# Patient Record
Sex: FEMALE | Race: OTHER | HISPANIC OR LATINO
[De-identification: names, ages, dates, MRNs, and addresses within clinical notes are randomized per-mention and may not be internally consistent; named-entity substitution may affect disease eponyms.]

---

## 2022-02-25 PROBLEM — Z00.00 ENCOUNTER FOR PREVENTIVE HEALTH EXAMINATION: Status: ACTIVE | Noted: 2022-02-25

## 2022-03-04 ENCOUNTER — APPOINTMENT (OUTPATIENT)
Dept: GYNECOLOGIC ONCOLOGY | Facility: CLINIC | Age: 52
End: 2022-03-04
Payer: SELF-PAY

## 2022-03-04 ENCOUNTER — NON-APPOINTMENT (OUTPATIENT)
Age: 52
End: 2022-03-04

## 2022-03-04 VITALS
HEIGHT: 61 IN | SYSTOLIC BLOOD PRESSURE: 172 MMHG | OXYGEN SATURATION: 100 % | WEIGHT: 111 LBS | HEART RATE: 81 BPM | TEMPERATURE: 97.1 F | DIASTOLIC BLOOD PRESSURE: 93 MMHG | BODY MASS INDEX: 20.96 KG/M2

## 2022-03-04 PROCEDURE — 58100 BIOPSY OF UTERUS LINING: CPT

## 2022-03-04 PROCEDURE — 99202 OFFICE O/P NEW SF 15 MIN: CPT | Mod: 25

## 2022-03-04 NOTE — HISTORY OF PRESENT ILLNESS
[FreeTextEntry1] : Previous Therapy:\par 1. TVUS (2/22/22)\par - Uterus: 10.3 x 5.6 x 6.3 cm\par - R. Ovary: 5.1 x 5.0 x 4.8cm complex hemorrhagic cyst\par - L Ovary: 4.2 x 3.0 x 2.4cm complex hemorrhagic cyst\par 2.  (1/26/22)\par - 155 u/mL \par 3. Anemia (1/26/22)\par - RBC: 3.50\par - HgB/Hct: 8.8 / 28\par - MCH: 25.1, MCHC: 31.4, RDW: 15.4

## 2022-03-04 NOTE — ASSESSMENT
[FreeTextEntry1] : I discussed with the patient with the aid of diagrams, reviewed the findings on history and physical examination, and reviewed the imaging studies in detail.  We reviewed the cystic/hemorrhagic nature of the mass, the CA-125, and other tumor markers. ACOG management of adnexal masses has been reviewed. SHe also has abnormal uterine bleeding x 3 months. While this may be 2' to the cervical polyp, uterine abnormalities must also be ruled out.\par \par We discussed the differential diagnosis which includes benign, low malignant potential tumors and malignancy. Other etiologies of adnexal masses, such as metastatic disease from another organ site also discussed.\par \par In the case of asymptomatic cysts, without findings concerning for malignancy (such as solid components, increasing size and vascular septations, elevated tumor markers) conservative management is an option. \par \par In the case of symptomatic cysts, or those with findings concerning for malignancy, the recommendation is for surgical removal. Again, with the aid of diagrams, different surgical approaches discussed including minimally invasive and open approaches.\par \par Laparoscopic USO vs. BSO discussed. Increased risk of cyst rupture with ovarian cystectomy explained. Frozen section will be performed; and the appropriate additional management including but not limited to removal of the other ovary, total hysterectomy, pelvic and para-aortic lymph node dissection, omentectomy and appendectomy. NCCN guidelines discussed. Possible laparotomy also discussed. If diagnosis is not clear on frozen section, we will avoid any additional procedures and defer to permanent pathology. This may require additional surgery and the patient states understanding this. \par \par Complications that include, but are not limited to: bleeding, infection, injury to other organs including bowel, bladder, ureters, blood vessels, nerves; infections, blood clots, lymphedema, pneumonia, wound complications and prolonged hospital stay have all been discussed with the patient. Whenever minimally invasive surgery is attempted, there is a chance of needing to convert to laparotomy. The risk of occult injury requiring additional surgery also discussed. I have also provided her with the diagrams.  \par \par Patient electing for conservative management at this time. I recommend a repeat TVS in 3-4 months to assess for any interval changes. I will also repeat  today as a trend. I explained that if  is increasing, I will likely recommend surgery.\par \par [] , , CEA today\par [] EMBx\par [] Polyp\par [] TVS in May\par [] Follow up after TVS\par [] Social work for financial assistance options.\par

## 2022-03-04 NOTE — PHYSICAL EXAM
[Abnormal] : Adnexa(ae): Abnormal [Normal] : Recto-Vaginal Exam: Normal [de-identified] : soft, mild tenderness in RLQ with deep palpation [de-identified] : brown discharge and a 5mm polyp arising from the cervical os [de-identified] : right 5 cm cyst abutting the right aspect of the uterus, left ovarian cyst could not be appreciated. mobile, mildly tender

## 2022-03-04 NOTE — CHIEF COMPLAINT
[FreeTextEntry1] : 52 yo referred by ob/gyn Dr. Arnold Mcdonald d/t complex bilateral hemorrhagic cyst & elevated Ca 125 of 155. R cyst approx 5cm, L cyst 4cm, endometrioma vs hemorrhagic cyst. Patient reports menses became irregular Oct 2021, intermittent spotting, sometimes lasting 15 days. She saw Dr. Mcdonald who ordered an ultrasound for the irregular bleeding and the b/l cysts were found.  Had TVUS in March 2021 that showed small fibroids but no ovarian cysts. LMP 1/31. Denies hx of pelvic pain, dysmenorrhea, no pain with BMs. Currently sexually active, no bleeding or pain with intercourse. \par \par OB Hx: c/s x3, SAB  \par GYN Hx: denies abnl paps, last March 2021 \par PMHx: denies \par PSHx: c/s x3 \par Meds: denies \par Allergies: NKDA \par FHx: Mother-  colon cancer \par Social Hx: Denies tobacco, occasional alcohol use, Lives in [place] with son and , works as a   \par \par Last Pap: March 2021- nl per patient \par Last Mammo: March 2021- nl per patient \par Last Colonoscopy: 2018- nl per patient \par

## 2022-03-04 NOTE — REVIEW OF SYSTEMS
[Negative] : Musculoskeletal [Vaginal Discharge] : vaginal discharge [Abn Vag Bleeding] : abnormal vaginal bleeding

## 2022-03-07 ENCOUNTER — NON-APPOINTMENT (OUTPATIENT)
Age: 52
End: 2022-03-07

## 2022-03-08 LAB
CANCER AG125 SERPL-ACNC: 14 U/ML
CANCER AG19-9 SERPL-ACNC: 18 U/ML
CEA SERPL-MCNC: 1.4 NG/ML
CORE LAB BIOPSY: NORMAL

## 2022-03-21 ENCOUNTER — NON-APPOINTMENT (OUTPATIENT)
Age: 52
End: 2022-03-21

## 2022-03-29 ENCOUNTER — NON-APPOINTMENT (OUTPATIENT)
Age: 52
End: 2022-03-29

## 2022-05-26 ENCOUNTER — NON-APPOINTMENT (OUTPATIENT)
Age: 52
End: 2022-05-26

## 2022-06-07 ENCOUNTER — APPOINTMENT (OUTPATIENT)
Dept: GYNECOLOGIC ONCOLOGY | Facility: CLINIC | Age: 52
End: 2022-06-07

## 2023-02-16 ENCOUNTER — NON-APPOINTMENT (OUTPATIENT)
Age: 53
End: 2023-02-16

## 2023-02-21 ENCOUNTER — APPOINTMENT (OUTPATIENT)
Dept: GYNECOLOGIC ONCOLOGY | Facility: CLINIC | Age: 53
End: 2023-02-21
Payer: MEDICAID

## 2023-02-21 VITALS
SYSTOLIC BLOOD PRESSURE: 136 MMHG | HEART RATE: 81 BPM | WEIGHT: 117 LBS | HEIGHT: 61 IN | BODY MASS INDEX: 22.09 KG/M2 | TEMPERATURE: 97.3 F | DIASTOLIC BLOOD PRESSURE: 94 MMHG | OXYGEN SATURATION: 95 %

## 2023-02-21 PROCEDURE — 58100 BIOPSY OF UTERUS LINING: CPT

## 2023-02-21 PROCEDURE — 99215 OFFICE O/P EST HI 40 MIN: CPT | Mod: 25

## 2023-02-21 RX ORDER — NORETHINDRONE ACETATE AND ETHINYL ESTRADIOL 1; 20 MG/1; UG/1
1-20 TABLET ORAL
Qty: 3 | Refills: 3 | Status: ACTIVE | COMMUNITY
Start: 2023-02-21 | End: 1900-01-01

## 2023-02-22 LAB
BASOPHILS # BLD AUTO: 0.05 K/UL
BASOPHILS NFR BLD AUTO: 0.9 %
EOSINOPHIL # BLD AUTO: 0.08 K/UL
EOSINOPHIL NFR BLD AUTO: 1.4 %
HCT VFR BLD CALC: 40.3 %
HGB BLD-MCNC: 12.9 G/DL
IMM GRANULOCYTES NFR BLD AUTO: 0 %
IRON SATN MFR SERPL: 9 %
IRON SERPL-MCNC: 43 UG/DL
LYMPHOCYTES # BLD AUTO: 1.44 K/UL
LYMPHOCYTES NFR BLD AUTO: 24.8 %
MAN DIFF?: NORMAL
MCHC RBC-ENTMCNC: 30.1 PG
MCHC RBC-ENTMCNC: 32 GM/DL
MCV RBC AUTO: 93.9 FL
MONOCYTES # BLD AUTO: 0.4 K/UL
MONOCYTES NFR BLD AUTO: 6.9 %
NEUTROPHILS # BLD AUTO: 3.84 K/UL
NEUTROPHILS NFR BLD AUTO: 66 %
PLATELET # BLD AUTO: 388 K/UL
RBC # BLD: 4.29 M/UL
RBC # FLD: 17.2 %
TIBC SERPL-MCNC: 475 UG/DL
UIBC SERPL-MCNC: 433 UG/DL
WBC # FLD AUTO: 5.81 K/UL

## 2023-02-24 ENCOUNTER — APPOINTMENT (OUTPATIENT)
Dept: GYNECOLOGIC ONCOLOGY | Facility: CLINIC | Age: 53
End: 2023-02-24
Payer: MEDICAID

## 2023-02-24 DIAGNOSIS — N83.299 OTHER OVARIAN CYST, UNSPECIFIED SIDE: ICD-10-CM

## 2023-02-24 DIAGNOSIS — N84.1 POLYP OF CERVIX UTERI: ICD-10-CM

## 2023-02-24 PROCEDURE — 36415 COLL VENOUS BLD VENIPUNCTURE: CPT

## 2023-02-27 ENCOUNTER — NON-APPOINTMENT (OUTPATIENT)
Age: 53
End: 2023-02-27

## 2023-02-27 LAB
CANCER AG125 SERPL-ACNC: 57 U/ML
CANCER AG19-9 SERPL-ACNC: 25 U/ML
CEA SERPL-MCNC: 1.6 NG/ML
CORE LAB BIOPSY: NORMAL
CYTOLOGY CVX/VAG DOC THIN PREP: NORMAL
HPV HIGH+LOW RISK DNA PNL CVX: NOT DETECTED

## 2023-02-27 NOTE — ASSESSMENT
[FreeTextEntry1] : I discussed with the patient with the aid of diagrams, reviewed the findings on history and physical examination, and reviewed the imaging studies in detail. We reviewed the cystic/hemorrhagic nature of the mass, the CA-125, and other tumor markers. ACOG management of adnexal masses has been reviewed. SHe also has abnormal uterine bleeding x 9 months. While this may be 2' to the cervical polyp, uterine abnormalities must also be ruled out.\par \par We discussed the differential diagnosis which includes benign, low malignant potential tumors and malignancy. Other etiologies of adnexal masses, such as metastatic disease from another organ site also discussed.\par \par In the case of asymptomatic cysts, without findings concerning for malignancy (such as solid components, increasing size and vascular septations, elevated tumor markers) conservative management is an option. In her case, the cysts have decreased in size which is more consistent with benign etiology. However, the radiology report describes the cysts as complex and "abnormal." I have left a message with the radiology office to have the Dr. Huerta call me.\par \par In the case of symptomatic cysts, or those with findings concerning for malignancy, the recommendation is for surgical removal. Again, with the aid of diagrams, different surgical approaches discussed including minimally invasive and open approaches.\par \par Causes of abnormal uterine bleeding also disucssed. EMBx successfully performed today.\par \par Patient likely needs a withdrawl bleed to "reset" her endometrial lining. I have advised that she stop the progesterone and start OCPs. She was screened for and has no contraindications to OCPs.\par \par [] Rx Lo-Estrin\par [] Tumor markers\par [] CBC and Iron studies\par [] PAP co-test\par [] EMBx\par [] Review Ultrasound findings with Dr. Huerta\par \par Addendum: Ultrasound reviewed with Dr. Huerta and most consistent with hemorrhagic cyst vs. endometrioma. He confirmed that there are no papillations or solid components and that the appearance of the cyst is unchanged from prior U/S. Will recommend that patient follow up with me in 2 months to reassess her uterine bleeding and to repeat tumor markers. \par \par

## 2023-02-27 NOTE — HISTORY OF PRESENT ILLNESS
[FreeTextEntry1] : Previous Therapy:\par 1. TVUS (2/22/22)\par - Uterus: 10.3 x 5.6 x 6.3 cm\par - R. Ovary: 5.1 x 5.0 x 4.8cm complex hemorrhagic cyst\par - L Ovary: 4.2 x 3.0 x 2.4cm complex hemorrhagic cyst\par 2.  (1/26/22)\par - 155 u/mL \par 3. Anemia (1/26/22)\par - RBC: 3.50\par - HgB/Hct: 8.8 / 28\par - MCH: 25.1, MCHC: 31.4, RDW: 15.4\par 4. S/P EMB AND endocervic polypectomy 3/2022- benign \par 5. Pelvic US 5/2022\par    a) right ovary - 5.6cm hemorrhagic cyst vs endometrioma\par    b) left ovary- 3.5cm septated cyst  \par  \par 6. Pelvis US 2/16/23\par    a) 10cm uterus\par    b) endometrium 0.67\par    c) right ovary - 3.9cm complex cyst - "abnormal" \par    d) left ovary- 5.2cm complex cyst - "abnormal"

## 2023-02-27 NOTE — PHYSICAL EXAM
[Abnormal] : Adnexa(ae): Abnormal [Normal] : Recto-Vaginal Exam: Normal [de-identified] : soft, mild tenderness in RLQ with deep palpation [de-identified] : brown discharge and a 5mm polyp arising from the cervical os [de-identified] : right ovarian cyst difficult to appreciate. Left ovarian cyst appreciated within ovarian fossa. nontender

## 2023-02-27 NOTE — REASON FOR VISIT
[FreeTextEntry1] : Pt presents for 1 year follow up. She presents c/o abnormal bleeding. Pt reports for the past 9 montsh she has been bleeding every day. Patient was recommended to follow up last May, but did not have insurance and was lost to follow up.\par \par She has been on norethindrone 5mg every day for 2 months but it hasn’t stopped.  She denies pelvic pain. Pelvic US ordered on 2/16/23.\par \par Pt states she didn’t come earlier because she didn’t have insurance up until last month. \par \par Health Maintenance\par Last Mammogram June 2022- normal as per patient \par Last pap smear - 3/2021- normal as per patient \par Colonoscopy - 2018- told to repeat in 5 years\par

## 2023-02-27 NOTE — PROCEDURE
[Endometrial Biopsy] : an endometrial biopsy [Other: ___] : [unfilled] [Patient] : the patient [Betadine] : betadine [Yes] : the specimen was sent to pathology [No Complications] : none [Tolerated Well] : the patient tolerated the procedure well [Post procedure instructions and information given] : post procedure instructions and information given and reviewed with patient. [FreeTextEntry1] : Patient declined uCG. Pipelle introduced to 8cm, three passes, copious blood and tissue.

## 2023-02-27 NOTE — CHIEF COMPLAINT
[FreeTextEntry1] : 50 yo referred by ob/gyn Dr. Arnold Mcdonald d/t complex bilateral hemorrhagic cyst & elevated Ca 125 of 155. R cyst approx 5cm, L cyst 4cm, endometrioma vs hemorrhagic cyst. Patient reports menses became irregular Oct 2021, intermittent spotting, sometimes lasting 15 days. She saw Dr. Mcdonald who ordered an ultrasound for the irregular bleeding and the b/l cysts were found.  Had TVUS in March 2021 that showed small fibroids but no ovarian cysts. LMP 1/31. Denies hx of pelvic pain, dysmenorrhea, no pain with BMs. Currently sexually active, no bleeding or pain with intercourse. \par \par OB Hx: c/s x3, SAB  \par GYN Hx: denies abnl paps, last March 2021 \par PMHx: denies \par PSHx: c/s x3 \par Meds: denies \par Allergies: NKDA \par FHx: Mother-  colon cancer \par Social Hx: Denies tobacco, occasional alcohol use, Lives in [place] with son and , works as a   \par \par Last Pap: March 2021- nl per patient \par Last Mammo: March 2021- nl per patient \par Last Colonoscopy: 2018- nl per patient \par

## 2023-03-01 ENCOUNTER — NON-APPOINTMENT (OUTPATIENT)
Age: 53
End: 2023-03-01

## 2023-03-30 PROBLEM — N83.299 COMPLEX OVARIAN CYST: Status: ACTIVE | Noted: 2022-03-04

## 2023-03-30 PROBLEM — N84.1 CERVICAL POLYP: Status: ACTIVE | Noted: 2022-03-04

## 2023-04-21 ENCOUNTER — APPOINTMENT (OUTPATIENT)
Dept: GYNECOLOGIC ONCOLOGY | Facility: CLINIC | Age: 53
End: 2023-04-21
Payer: COMMERCIAL

## 2023-04-21 VITALS
SYSTOLIC BLOOD PRESSURE: 151 MMHG | TEMPERATURE: 94.8 F | BODY MASS INDEX: 22.66 KG/M2 | HEART RATE: 68 BPM | WEIGHT: 120 LBS | OXYGEN SATURATION: 99 % | DIASTOLIC BLOOD PRESSURE: 86 MMHG | HEIGHT: 61 IN

## 2023-04-21 PROCEDURE — 99215 OFFICE O/P EST HI 40 MIN: CPT

## 2023-04-21 NOTE — PHYSICAL EXAM
[Present] : CVAT: present [Normal] : Anus and perineum: Normal sphincter tone, no masses, no prolapse. [de-identified] : dark blood noted in vaginal vault

## 2023-04-21 NOTE — CHIEF COMPLAINT
[FreeTextEntry1] : 50 yo referred by ob/gyn Dr. Arnold Mcdonald d/t complex bilateral hemorrhagic cyst & elevated Ca 125 of 155. R cyst approx 5cm, L cyst 4cm, endometrioma vs hemorrhagic cyst. Patient reports menses became irregular Oct 2021, intermittent spotting, sometimes lasting 15 days. She saw Dr. Mcdonald who ordered an ultrasound for the irregular bleeding and the b/l cysts were found.  Had TVUS in March 2021 that showed small fibroids but no ovarian cysts. LMP 1/31. Denies hx of pelvic pain, dysmenorrhea, no pain with BMs. Currently sexually active, no bleeding or pain with intercourse. \par \par OB Hx: c/s x3, SAB  \par GYN Hx: denies abnl paps, last March 2021 \par PMHx: denies \par PSHx: c/s x3 \par Meds: denies \par Allergies: NKDA \par FHx: Mother-  colon cancer \par Social Hx: Denies tobacco, occasional alcohol use, Lives in [place] with son and , works as a   \par \par Last Pap: March 2021- nl per patient \par Last Mammo: March 2021- nl per patient \par Last Colonoscopy: 2018- nl per patient \par

## 2023-04-21 NOTE — ASSESSMENT
[FreeTextEntry1] : I discussed with the patient with the aid of diagrams, reviewed the findings on history and physical examination, and reviewed the imaging studies in detail. She has dysfunctional uterine bleeding that is not responding to OCPs.\par \par Benign, pre-malignant (such as atypical hyperplasia) and malignant causes of these findings discussed. In order to determine the cause of her symptoms, sampling of the uterus is necessary. The uterus can be sampled either by an in-office endometrial biopsy or via D&C hysteroscopy. In the case of suspected endometrial polyp, a D&C/hysteroscopy is indicated. This allows for removal of the endometrial polyp. This is both diagnostic and therapeutic. \par \par Complications that include, but are not limited to: bleeding, infection and uterine perforation discussed. In the case of uterine perforation, diagnostic laparoscopy may be indicated. Cervical stenosis and possible inability to enter the uterine cavity was also discussed. I have also provided her with the diagrams. \par \par Surgical scheduling was discussed and instructions for optimization prior to surgery were given. NPO after midnight.  No aspirin or NSAID products for 1 week prior. Instructions for misoprostol 48 hours prior to surgery given.  A copy of the above diagrams was given to the patient. \par \par [] Medical clearance\par [] Pre-op labs\par [] Miso\par [] D&C, hysteroscopy, polypectomy\par \par

## 2023-04-21 NOTE — REASON FOR VISIT
[FreeTextEntry1] : Pt presents for 2 month reassessment of vaginal bleeding on OCP. Since staring the OCP the bleeding has improved but is still sporadic -  She will start the pack, not bleed for 2-3 days, and then start a very light period for 2-3 days again. This will happen throughout her pill pack. She only has 1 pad / day, so the bleeding is much lighter.  Denies dizziness/lightheadedness. Feels some bloating after starting the OCP pack for the first 1-2 days but then improves. \par \par Health Maintenance\par Last Mammogram June 2022- normal as per patient \par Last pap smear - 2/2023 - performed here, normal \par Colonoscopy - 2018- told to repeat in 5 years\par

## 2023-04-21 NOTE — HISTORY OF PRESENT ILLNESS
[FreeTextEntry1] : Previous Therapy:\par 1. TVUS (2/22/22)\par - Uterus: 10.3 x 5.6 x 6.3 cm\par - R. Ovary: 5.1 x 5.0 x 4.8cm complex hemorrhagic cyst\par - L Ovary: 4.2 x 3.0 x 2.4cm complex hemorrhagic cyst\par 2.  (1/26/22)\par - 155 u/mL \par 3. Anemia (1/26/22)\par - RBC: 3.50\par - HgB/Hct: 8.8 / 28\par - MCH: 25.1, MCHC: 31.4, RDW: 15.4\par 4. S/P EMB AND endocervic polypectomy 3/2022- benign \par 5. Pelvic US 5/2022\par    a) right ovary - 5.6cm hemorrhagic cyst vs endometrioma\par    b) left ovary- 3.5cm septated cyst  \par  \par 6. Pelvis US 2/16/23\par    a) 10cm uterus\par    b) endometrium 0.67\par    c) right ovary - 3.9cm complex cyst - "abnormal" \par    d) left ovary- 5.2cm complex cyst - "abnormal" \par \par 7) : 58 ON 2/27/2023

## 2023-05-08 RX ORDER — MISOPROSTOL 200 UG/1
200 TABLET ORAL
Qty: 8 | Refills: 0 | Status: ACTIVE | COMMUNITY
Start: 2023-04-26 | End: 1900-01-01

## 2023-05-12 NOTE — ASU PATIENT PROFILE, ADULT - NS PREOP UNDERSTANDS INFO
No solid food/dairy/candy/gum after midnight Sunday, water before 07:30am Monday, patient to bring photo ID/insurance card, to dress comfortable, no jewelries/valuables/contact lens, no smoking/alcohol drinking/recreation drug use on Sunday, address and callback number was given/yes

## 2023-05-14 ENCOUNTER — TRANSCRIPTION ENCOUNTER (OUTPATIENT)
Age: 53
End: 2023-05-14

## 2023-05-15 ENCOUNTER — RESULT REVIEW (OUTPATIENT)
Age: 53
End: 2023-05-15

## 2023-05-15 ENCOUNTER — TRANSCRIPTION ENCOUNTER (OUTPATIENT)
Age: 53
End: 2023-05-15

## 2023-05-15 ENCOUNTER — OUTPATIENT (OUTPATIENT)
Dept: OUTPATIENT SERVICES | Facility: HOSPITAL | Age: 53
LOS: 1 days | Discharge: HOME | End: 2023-05-15
Payer: COMMERCIAL

## 2023-05-15 ENCOUNTER — APPOINTMENT (OUTPATIENT)
Dept: GYNECOLOGIC ONCOLOGY | Facility: AMBULATORY SURGERY CENTER | Age: 53
End: 2023-05-15

## 2023-05-15 VITALS
WEIGHT: 117.29 LBS | RESPIRATION RATE: 16 BRPM | DIASTOLIC BLOOD PRESSURE: 86 MMHG | HEIGHT: 61 IN | TEMPERATURE: 98 F | HEART RATE: 69 BPM | SYSTOLIC BLOOD PRESSURE: 158 MMHG | OXYGEN SATURATION: 100 %

## 2023-05-15 VITALS
SYSTOLIC BLOOD PRESSURE: 129 MMHG | DIASTOLIC BLOOD PRESSURE: 75 MMHG | TEMPERATURE: 97 F | HEART RATE: 78 BPM | OXYGEN SATURATION: 98 % | RESPIRATION RATE: 16 BRPM

## 2023-05-15 PROCEDURE — 58558 HYSTEROSCOPY BIOPSY: CPT

## 2023-05-15 PROCEDURE — 88305 TISSUE EXAM BY PATHOLOGIST: CPT | Mod: 26

## 2023-05-15 RX ORDER — SODIUM CHLORIDE 9 MG/ML
1000 INJECTION, SOLUTION INTRAVENOUS
Refills: 0 | Status: DISCONTINUED | OUTPATIENT
Start: 2023-05-15 | End: 2023-05-15

## 2023-05-15 RX ORDER — SODIUM CHLORIDE 9 MG/ML
500 INJECTION, SOLUTION INTRAVENOUS ONCE
Refills: 0 | Status: COMPLETED | OUTPATIENT
Start: 2023-05-15 | End: 2023-05-15

## 2023-05-15 RX ORDER — ACETAMINOPHEN 500 MG
1000 TABLET ORAL ONCE
Refills: 0 | Status: COMPLETED | OUTPATIENT
Start: 2023-05-15 | End: 2023-05-15

## 2023-05-15 RX ORDER — FENTANYL CITRATE 50 UG/ML
25 INJECTION INTRAVENOUS
Refills: 0 | Status: DISCONTINUED | OUTPATIENT
Start: 2023-05-15 | End: 2023-05-15

## 2023-05-15 RX ORDER — ONDANSETRON 8 MG/1
4 TABLET, FILM COATED ORAL ONCE
Refills: 0 | Status: DISCONTINUED | OUTPATIENT
Start: 2023-05-15 | End: 2023-05-15

## 2023-05-15 RX ADMIN — Medication 1000 MILLIGRAM(S): at 09:48

## 2023-05-15 RX ADMIN — SODIUM CHLORIDE 100 MILLILITER(S): 9 INJECTION, SOLUTION INTRAVENOUS at 12:11

## 2023-05-15 RX ADMIN — SODIUM CHLORIDE 1000 MILLILITER(S): 9 INJECTION, SOLUTION INTRAVENOUS at 11:31

## 2023-05-15 NOTE — ASU DISCHARGE PLAN (ADULT/PEDIATRIC) - NS MD DC FALL RISK RISK
For information on Fall & Injury Prevention, visit: [email or web address] OR  [email or web address] OR  https://www.cdc.gov/steadi/patient.html

## 2023-05-15 NOTE — BRIEF OPERATIVE NOTE - NSICDXBRIEFPROCEDURE_GEN_ALL_CORE_FT
PROCEDURES:  Diagnostic hysteroscopy for abnormal uterine bleeding 15-May-2023 10:49:05  Stephanie Perez

## 2023-05-17 LAB — SURGICAL PATHOLOGY STUDY: SIGNIFICANT CHANGE UP

## 2023-06-13 ENCOUNTER — APPOINTMENT (OUTPATIENT)
Dept: GYNECOLOGIC ONCOLOGY | Facility: CLINIC | Age: 53
End: 2023-06-13
Payer: COMMERCIAL

## 2023-06-13 VITALS
OXYGEN SATURATION: 98 % | SYSTOLIC BLOOD PRESSURE: 157 MMHG | WEIGHT: 120 LBS | TEMPERATURE: 97.4 F | BODY MASS INDEX: 22.66 KG/M2 | DIASTOLIC BLOOD PRESSURE: 87 MMHG | HEART RATE: 73 BPM | HEIGHT: 61 IN

## 2023-06-13 DIAGNOSIS — N93.8 OTHER SPECIFIED ABNORMAL UTERINE AND VAGINAL BLEEDING: ICD-10-CM

## 2023-06-13 PROCEDURE — 99212 OFFICE O/P EST SF 10 MIN: CPT

## 2023-06-13 NOTE — HISTORY OF PRESENT ILLNESS
[FreeTextEntry1] : Previous Therapy:\par 1. TVUS (2/22/22)\par - Uterus: 10.3 x 5.6 x 6.3 cm\par - R. Ovary: 5.1 x 5.0 x 4.8cm complex hemorrhagic cyst\par - L Ovary: 4.2 x 3.0 x 2.4cm complex hemorrhagic cyst\par 2.  (1/26/22)\par - 155 u/mL \par 3. Anemia (1/26/22)\par - RBC: 3.50\par - HgB/Hct: 8.8 / 28\par - MCH: 25.1, MCHC: 31.4, RDW: 15.4\par 4. S/P EMB AND endocervic polypectomy 3/2022- benign \par 5. Pelvic US 5/2022\par    a) right ovary - 5.6cm hemorrhagic cyst vs endometrioma\par    b) left ovary- 3.5cm septated cyst  \par  \par 6. Pelvis US 2/16/23\par    a) 10cm uterus\par    b) endometrium 0.67\par    c) right ovary - 3.9cm complex cyst - "abnormal" \par    d) left ovary- 5.2cm complex cyst - "abnormal" \par \par 7) : 58 ON 2/27/2023\par \par 8) S/P hysteroscopy & D&C 5/15/23\par     a) Inactive endometrium with focal stromal and glandular breakdown

## 2023-06-13 NOTE — REASON FOR VISIT
[FreeTextEntry1] : 1 Month Post-op\par \par 51yo s/p hysteroscopy and D&C here for 1 month post-op visit. She reports scan pink discharge once a week, but is not bothered by it. \par \par Health Maintenance\par Last Mammogram June 2022- normal as per patient \par Last pap smear - 2/2023 - performed here, normal \par Colonoscopy - 2018- told to repeat in 5 years\par

## 2023-06-13 NOTE — ASSESSMENT
[FreeTextEntry1] : Appropriate 1 month recovery\par \par Benign pathology discussed.\par \par [] Mammogram referral given\par [] F/U in 1 year for routine gyn care with Lanny

## 2024-07-29 ENCOUNTER — HOSPITAL ENCOUNTER (OUTPATIENT)
Dept: HOSPITAL 74 - JASUSAT | Age: 54
LOS: 1 days | Discharge: HOME | End: 2024-07-30
Attending: OBSTETRICS & GYNECOLOGY
Payer: COMMERCIAL

## 2024-07-29 VITALS — BODY MASS INDEX: 29.2 KG/M2

## 2024-07-29 DIAGNOSIS — N80.202: ICD-10-CM

## 2024-07-29 DIAGNOSIS — N80.101: ICD-10-CM

## 2024-07-29 DIAGNOSIS — N80.03: ICD-10-CM

## 2024-07-29 DIAGNOSIS — D25.2: ICD-10-CM

## 2024-07-29 DIAGNOSIS — D25.1: Primary | ICD-10-CM

## 2024-07-29 LAB
ANION GAP SERPL CALC-SCNC: 8 MMOL/L (ref 4–13)
BUN SERPL-MCNC: 9.1 MG/DL (ref 7–18)
CALCIUM SERPL-MCNC: 8.6 MG/DL (ref 8.5–10.1)
CHLORIDE SERPL-SCNC: 105 MMOL/L (ref 98–107)
CO2 SERPL-SCNC: 25 MMOL/L (ref 21–32)
CREAT SERPL-MCNC: 0.6 MG/DL (ref 0.55–1.3)
DEPRECATED RDW RBC AUTO: 12.8 % (ref 11.6–15.6)
GLUCOSE SERPL-MCNC: 163 MG/DL (ref 74–106)
HCT VFR BLD CALC: 33.7 % (ref 32.4–45.2)
HGB BLD-MCNC: 11.6 GM/DL (ref 10.7–15.3)
MCH RBC QN AUTO: 30.9 PG (ref 25.7–33.7)
MCHC RBC AUTO-ENTMCNC: 34.4 G/DL (ref 32–36)
MCV RBC: 89.6 FL (ref 80–96)
PLATELET # BLD AUTO: 205 10^3/UL (ref 134–434)
PMV BLD: 9.1 FL (ref 7.5–11.1)
POTASSIUM SERPLBLD-SCNC: 3.8 MMOL/L (ref 3.5–5.1)
RBC # BLD AUTO: 3.76 M/MM3 (ref 3.6–5.2)
SODIUM SERPL-SCNC: 138 MMOL/L (ref 136–145)
WBC # BLD AUTO: 19.7 K/MM3 (ref 4–10)

## 2024-07-29 PROCEDURE — 0UT9FZZ RESECTION OF UTERUS, VIA NATURAL OR ARTIFICIAL OPENING WITH PERCUTANEOUS ENDOSCOPIC ASSISTANCE: ICD-10-PCS | Performed by: OBSTETRICS & GYNECOLOGY

## 2024-07-29 PROCEDURE — 58552 LAPARO-VAG HYST INCL T/O: CPT

## 2024-07-29 PROCEDURE — 8E0W4CZ ROBOTIC ASSISTED PROCEDURE OF TRUNK REGION, PERCUTANEOUS ENDOSCOPIC APPROACH: ICD-10-PCS | Performed by: OBSTETRICS & GYNECOLOGY

## 2024-07-29 PROCEDURE — 0UT2FZZ RESECTION OF BILATERAL OVARIES, VIA NATURAL OR ARTIFICIAL OPENING WITH PERCUTANEOUS ENDOSCOPIC ASSISTANCE: ICD-10-PCS | Performed by: OBSTETRICS & GYNECOLOGY

## 2024-07-29 PROCEDURE — 0UT7FZZ RESECTION OF BILATERAL FALLOPIAN TUBES, VIA NATURAL OR ARTIFICIAL OPENING WITH PERCUTANEOUS ENDOSCOPIC ASSISTANCE: ICD-10-PCS | Performed by: OBSTETRICS & GYNECOLOGY

## 2024-07-29 RX ADMIN — ACETAMINOPHEN ONE MG: 500 TABLET, FILM COATED ORAL at 06:31

## 2024-07-29 RX ADMIN — DOCUSATE SODIUM PRN MG: 100 CAPSULE, LIQUID FILLED ORAL at 22:29

## 2024-07-29 RX ADMIN — GABAPENTIN ONE MG: 300 CAPSULE ORAL at 06:31

## 2024-07-29 RX ADMIN — PHENAZOPYRIDINE ONE MG: 100 TABLET ORAL at 06:30

## 2024-07-29 RX ADMIN — CEFAZOLIN SCH MLS/HR: 1 INJECTION, POWDER, FOR SOLUTION INTRAVENOUS at 17:16

## 2024-07-29 RX ADMIN — IBUPROFEN PRN MG: 800 INJECTION INTRAVENOUS at 14:23

## 2024-07-29 RX ADMIN — SODIUM CHLORIDE, POTASSIUM CHLORIDE, SODIUM LACTATE AND CALCIUM CHLORIDE SCH MLS/HR: 600; 310; 30; 20 INJECTION, SOLUTION INTRAVENOUS at 10:45

## 2024-07-29 RX ADMIN — ACETAMINOPHEN ONE MG: 10 INJECTION, SOLUTION INTRAVENOUS at 10:44

## 2024-07-29 RX ADMIN — CEFAZOLIN ONE: 1 INJECTION, POWDER, FOR SOLUTION INTRAVENOUS at 21:50

## 2024-07-29 RX ADMIN — ACETAMINOPHEN SCH MG: 325 TABLET ORAL at 17:12

## 2024-07-29 RX ADMIN — SIMETHICONE CHEW TAB 80 MG PRN MG: 80 TABLET ORAL at 19:54

## 2024-07-30 VITALS — TEMPERATURE: 98.3 F | HEART RATE: 67 BPM | DIASTOLIC BLOOD PRESSURE: 84 MMHG | SYSTOLIC BLOOD PRESSURE: 139 MMHG

## 2024-07-30 VITALS — RESPIRATION RATE: 18 BRPM

## 2024-07-30 LAB
ANION GAP SERPL CALC-SCNC: 7 MMOL/L (ref 4–13)
BUN SERPL-MCNC: 7.5 MG/DL (ref 7–18)
CALCIUM SERPL-MCNC: 8.4 MG/DL (ref 8.5–10.1)
CHLORIDE SERPL-SCNC: 104 MMOL/L (ref 98–107)
CO2 SERPL-SCNC: 28 MMOL/L (ref 21–32)
CREAT SERPL-MCNC: 0.5 MG/DL (ref 0.55–1.3)
DEPRECATED RDW RBC AUTO: 13.1 % (ref 11.6–15.6)
GLUCOSE SERPL-MCNC: 113 MG/DL (ref 74–106)
HCT VFR BLD CALC: 34.2 % (ref 32.4–45.2)
HGB BLD-MCNC: 11.6 GM/DL (ref 10.7–15.3)
MCH RBC QN AUTO: 30.8 PG (ref 25.7–33.7)
MCHC RBC AUTO-ENTMCNC: 33.9 G/DL (ref 32–36)
MCV RBC: 90.7 FL (ref 80–96)
PLATELET # BLD AUTO: 216 10^3/UL (ref 134–434)
PMV BLD: 9.6 FL (ref 7.5–11.1)
POTASSIUM SERPLBLD-SCNC: 3.9 MMOL/L (ref 3.5–5.1)
RBC # BLD AUTO: 3.77 M/MM3 (ref 3.6–5.2)
SODIUM SERPL-SCNC: 139 MMOL/L (ref 136–145)
WBC # BLD AUTO: 17.4 K/MM3 (ref 4–10)

## 2024-07-30 RX ADMIN — ENOXAPARIN SODIUM SCH MG: 40 INJECTION SUBCUTANEOUS at 10:29

## (undated) DEVICE — PACK D&C

## (undated) DEVICE — DRAPE IRRIGATION POUCH 19X23"

## (undated) DEVICE — POSITIONER FOAM EGG CRATE ULNAR 2PCS (PINK)

## (undated) DEVICE — POSITIONER STRAP ARMBOARD 1.5X32" DISP

## (undated) DEVICE — WARMING BLANKET UPPER ADULT

## (undated) DEVICE — DRAPE TOWEL BLUE 17" X 24"

## (undated) DEVICE — TUBING SET GRAVITY 2 SPIKE

## (undated) DEVICE — DRSG PAD SANITARY OB

## (undated) DEVICE — GLV 6.5 PROTEXIS (WHITE)

## (undated) DEVICE — SLV COMPRESSION KNEE MED